# Patient Record
Sex: FEMALE | Employment: PART TIME | URBAN - METROPOLITAN AREA
[De-identification: names, ages, dates, MRNs, and addresses within clinical notes are randomized per-mention and may not be internally consistent; named-entity substitution may affect disease eponyms.]

---

## 2020-02-26 ENCOUNTER — TRANSCRIBE ORDERS (OUTPATIENT)
Dept: PERINATAL CARE | Facility: CLINIC | Age: 25
End: 2020-02-26

## 2020-02-26 DIAGNOSIS — O09.899 SUPERVISION OF OTHER HIGH RISK PREGNANCIES, UNSPECIFIED TRIMESTER: Primary | ICD-10-CM

## 2024-07-17 ENCOUNTER — TRANSCRIBE ORDERS (OUTPATIENT)
Facility: HOSPITAL | Age: 29
End: 2024-07-17

## 2024-07-17 DIAGNOSIS — O09.899 SUPERVISION OF OTHER HIGH RISK PREGNANCY, ANTEPARTUM: Primary | ICD-10-CM

## 2024-07-30 ENCOUNTER — ROUTINE PRENATAL (OUTPATIENT)
Dept: PERINATAL CARE | Facility: OTHER | Age: 29
End: 2024-07-30
Payer: COMMERCIAL

## 2024-07-30 ENCOUNTER — TELEPHONE (OUTPATIENT)
Dept: PERINATAL CARE | Facility: OTHER | Age: 29
End: 2024-07-30

## 2024-07-30 VITALS
SYSTOLIC BLOOD PRESSURE: 98 MMHG | BODY MASS INDEX: 30.39 KG/M2 | HEIGHT: 64 IN | HEART RATE: 99 BPM | DIASTOLIC BLOOD PRESSURE: 50 MMHG | WEIGHT: 178 LBS

## 2024-07-30 DIAGNOSIS — O99.282 THYROID DYSFUNCTION IN PREGNANCY IN SECOND TRIMESTER: ICD-10-CM

## 2024-07-30 DIAGNOSIS — Z3A.21 21 WEEKS GESTATION OF PREGNANCY: ICD-10-CM

## 2024-07-30 DIAGNOSIS — Z36.86 ENCOUNTER FOR ANTENATAL SCREENING FOR CERVICAL LENGTH: ICD-10-CM

## 2024-07-30 DIAGNOSIS — E07.9 THYROID DYSFUNCTION IN PREGNANCY IN SECOND TRIMESTER: ICD-10-CM

## 2024-07-30 DIAGNOSIS — E03.8 SUBCLINICAL HYPOTHYROIDISM: ICD-10-CM

## 2024-07-30 DIAGNOSIS — O09.292 PRIOR FETAL MACROSOMIA IN SECOND TRIMESTER, ANTEPARTUM: Primary | ICD-10-CM

## 2024-07-30 DIAGNOSIS — O09.899 SUPERVISION OF OTHER HIGH RISK PREGNANCY, ANTEPARTUM: ICD-10-CM

## 2024-07-30 PROCEDURE — 99243 OFF/OP CNSLTJ NEW/EST LOW 30: CPT | Performed by: OBSTETRICS & GYNECOLOGY

## 2024-07-30 PROCEDURE — 76811 OB US DETAILED SNGL FETUS: CPT | Performed by: OBSTETRICS & GYNECOLOGY

## 2024-07-30 PROCEDURE — 76817 TRANSVAGINAL US OBSTETRIC: CPT | Performed by: OBSTETRICS & GYNECOLOGY

## 2024-07-30 NOTE — LETTER
2024     Kenedy Gutierrez  [address]  [place] [address]    Patient: Jaida Leroy   YOB: 1995   Date of Visit: 2024       Dear Dr. Whitley:    Thank you for referring Jaida Leroy to me for evaluation. Below are my notes for this consultation.    If you have questions, please do not hesitate to call me. I look forward to following your patient along with you.         Sincerely,        Vonnie Akins MD        CC: No Recipients    Vonnie Akins MD  2024  6:24 AM  Sign when Signing Visit  OFFICE CONSULT  Raegan Cuadra Md  [address]  [place] [address]     Dear Dr. Cuadra     Thank you for requesting a  consultation on your patient Jaida Leroy for the following indications: Fetal anatomical survey.  She had a normal NIPT completed in this pregnancy.    History  Past medical history: subclinical hypothyroidism diagnosed in pregnancy but she reports having a history of being diagnosed with a thyroid nodule for which she was encouraged to have follow-up in the past. She was referred to endocrinology during this pregnancy by her ob office but still needs to  to schedule her visit.  Her most recent TSH was 3.38.  Thyroperoxidase level was normal showing no antibodies and her free T4 was normal at 1.06 indicating that she has subclinical hypothyroidism.  She was started on levothyroxine 25 mcg daily by her ob office. She also has history of genital herpes and is on Valtrex as needed.  Lastly she is B negative blood type.  Past surgical history: Bunion removal, wisdom teeth removed, history of ptosis repair  Medications: Planning on start pnv and a separate iron tab as she has mild anemia and is on Levothyroxine 25 mcg daily  Allergies to medications: none  Past obstetrical history:   1/15/2013 at 41 weeks she had an 8 pound 11 ounce baby boy vaginally without complications  3/18/2017 at 39 weeks she had an 8 pound baby girl vaginally  without complications  9/23/2020 at 39 weeks she had a 9 pound baby girl vaginally without complications  Social history: noncontributory  First generation family history: Her father has diabetes    Ultrasound findings: Ultrasound shows a fetus that is growing concordant with her dates.  Normal amniotic fluid and cervical length are seen.  No malformations are detected however views of the spine are limited secondary to fetal position.  Intracranial anatomy appears normal lowering the risk for missed spinal defect.    The patient was informed of the findings and counseled about the limitations of the exam in detecting all forms of fetal congenital abnormalities.    She does not report any vaginal bleeding or uterine cramping/contractions. She does feel fetal movement.      Specific counseling was provided on the following problems:  1.  In pregnancy subclinical hypothyroidism with a normal free T4 and no evidence for antithyroid peroxidase antibodies does not require treatment.  But if treatment is started then recommend obtaining normal pregnancy levels of TSH.  In the first trimester, the normal range for TSH level is 0.1 to 2.5 mIU/L; this level increases to 0.2 to 3.0 mIU/L in the second trimester and 0.3 to 3.0 mIU/L in the third trimester.  Her TSH levels should be rechecked with each trimester and then her meds titrated to keep her TSH in the normal range for pregnancy. Concurrent use of iron or calcium and her thyroid meds may result in decreased effectiveness of her meds.  Postpartum she can stop her thyroid medication but would recheck a level at 6 weeks postpartum to confirm she not develop overt hypothyroidism.    Follow up recommended:   Recommend an early diabetes screen due to family hx of diabetes and her history of having a macrosomic baby.  2.   Maternal anemia -she reports she was told to start iron.  I reviewed that iron absorption is inhibited by medication for reflux, coffee, tea, milk products  and calcium.  It is better absorbed on an empty stomach or with vitamin C and can be taken in every other day to limit constipation.   3.   Encouraged to restart her prenatal vitamins which can be taken with a meal.  4.   Thyroid medication needs to be taken on an empty stomach at least 1 hour prior to a meal and 4 hours before after iron supplementation.  She reports she will take her thyroid medication first thing in the morning.  She reports she will take her iron before bedtime on an empty stomach or when she wakes up in the middle the night to go the bathroom so that is at least 4 hours prior to taking her thyroid medication in the morning.  She will take her prenatal vitamin with a meal.   5.        Pre visit time reviewing her records  5 minutes  Face to face time 15 minutes  Post visit time on documentation of note, updating her problem list, adding orders and prescriptions 15 minutes.  Procedures that were completed today were charged separately.   The level of decision making was low level complexity.    Vonnie Akins MD

## 2024-07-30 NOTE — PROGRESS NOTES
OFFICE CONSULT  Raegan Cuadra Md  [address]  [place] [address]     Dear Dr. Cuadra     Thank you for requesting a  consultation on your patient Jaida Leroy for the following indications: Fetal anatomical survey.  She had a normal NIPT completed in this pregnancy.    History  Past medical history: subclinical hypothyroidism diagnosed in pregnancy but she reports having a history of being diagnosed with a thyroid nodule for which she was encouraged to have follow-up in the past. She was referred to endocrinology during this pregnancy by her ob office but still needs to  to schedule her visit.  Her most recent TSH was 3.38.  Thyroperoxidase level was normal showing no antibodies and her free T4 was normal at 1.06 indicating that she has subclinical hypothyroidism.  She was started on levothyroxine 25 mcg daily by her ob office. She also has history of genital herpes and is on Valtrex as needed.  Lastly she is B negative blood type.  Past surgical history: Bunion removal, wisdom teeth removed, history of ptosis repair  Medications: Planning on start pnv and a separate iron tab as she has mild anemia and is on Levothyroxine 25 mcg daily  Allergies to medications: none  Past obstetrical history:   1/15/2013 at 41 weeks she had an 8 pound 11 ounce baby boy vaginally without complications  3/18/2017 at 39 weeks she had an 8 pound baby girl vaginally without complications  2020 at 39 weeks she had a 9 pound baby girl vaginally without complications  Social history: noncontributory  First generation family history: Her father has diabetes    Ultrasound findings: Ultrasound shows a fetus that is growing concordant with her dates.  Normal amniotic fluid and cervical length are seen.  No malformations are detected however views of the spine are limited secondary to fetal position.  Intracranial anatomy appears normal lowering the risk for missed spinal defect.    The patient was informed of  the findings and counseled about the limitations of the exam in detecting all forms of fetal congenital abnormalities.    She does not report any vaginal bleeding or uterine cramping/contractions. She does feel fetal movement.      Specific counseling was provided on the following problems:  1.  In pregnancy subclinical hypothyroidism with a normal free T4 and no evidence for antithyroid peroxidase antibodies does not require treatment.  But if treatment is started then recommend obtaining normal pregnancy levels of TSH.  In the first trimester, the normal range for TSH level is 0.1 to 2.5 mIU/L; this level increases to 0.2 to 3.0 mIU/L in the second trimester and 0.3 to 3.0 mIU/L in the third trimester.  Her TSH levels should be rechecked with each trimester and then her meds titrated to keep her TSH in the normal range for pregnancy. Concurrent use of iron or calcium and her thyroid meds may result in decreased effectiveness of her meds.  Postpartum she can stop her thyroid medication but would recheck a level at 6 weeks postpartum to confirm she not develop overt hypothyroidism.    Follow up recommended:   Recommend an early diabetes screen due to family hx of diabetes and her history of having a macrosomic baby.  2.   Maternal anemia -she reports she was told to start iron.  I reviewed that iron absorption is inhibited by medication for reflux, coffee, tea, milk products and calcium.  It is better absorbed on an empty stomach or with vitamin C and can be taken in every other day to limit constipation.   3.   Encouraged to restart her prenatal vitamins which can be taken with a meal.  4.   Thyroid medication needs to be taken on an empty stomach at least 1 hour prior to a meal and 4 hours before after iron supplementation.  She reports she will take her thyroid medication first thing in the morning.  She reports she will take her iron before bedtime on an empty stomach or when she wakes up in the middle the night  to go the bathroom so that is at least 4 hours prior to taking her thyroid medication in the morning.  She will take her prenatal vitamin with a meal.   5.        Pre visit time reviewing her records  5 minutes  Face to face time 15 minutes  Post visit time on documentation of note, updating her problem list, adding orders and prescriptions 15 minutes.  Procedures that were completed today were charged separately.   The level of decision making was low level complexity.    Vonnie Akins MD

## 2024-07-30 NOTE — TELEPHONE ENCOUNTER
Patient was seen in [facility] office on 7/30. Per Dr. Akins's recommendations, patient should return in 10 weeks for a growth US. Spoke with patient and scheduled her for 10/7 at 11:30AM in the [facility] office. Requested patient call the office back at [phone] if she has any questions or would need to reschedule.

## 2024-08-01 PROBLEM — O99.282 THYROID DYSFUNCTION IN PREGNANCY IN SECOND TRIMESTER: Status: ACTIVE | Noted: 2024-08-01

## 2024-08-01 PROBLEM — E03.8 SUBCLINICAL HYPOTHYROIDISM: Status: ACTIVE | Noted: 2024-08-01

## 2024-08-01 PROBLEM — O09.292 PRIOR FETAL MACROSOMIA IN SECOND TRIMESTER, ANTEPARTUM: Status: ACTIVE | Noted: 2024-08-01

## 2024-08-01 PROBLEM — E07.9 THYROID DYSFUNCTION IN PREGNANCY IN SECOND TRIMESTER: Status: ACTIVE | Noted: 2024-08-01

## 2024-08-01 RX ORDER — LEVOTHYROXINE SODIUM 0.03 MG/1
25 TABLET ORAL DAILY
COMMUNITY
Start: 2024-07-23

## 2024-10-07 ENCOUNTER — ULTRASOUND (OUTPATIENT)
Dept: PERINATAL CARE | Facility: OTHER | Age: 29
End: 2024-10-07
Payer: COMMERCIAL

## 2024-10-07 VITALS
WEIGHT: 196.2 LBS | HEIGHT: 64 IN | DIASTOLIC BLOOD PRESSURE: 58 MMHG | SYSTOLIC BLOOD PRESSURE: 112 MMHG | HEART RATE: 93 BPM | BODY MASS INDEX: 33.49 KG/M2

## 2024-10-07 DIAGNOSIS — Z3A.31 31 WEEKS GESTATION OF PREGNANCY: ICD-10-CM

## 2024-10-07 DIAGNOSIS — E07.9 THYROID DYSFUNCTION IN PREGNANCY IN THIRD TRIMESTER: Primary | ICD-10-CM

## 2024-10-07 DIAGNOSIS — Z36.2 ENCOUNTER FOR FOLLOW-UP ULTRASOUND OF FETAL ANATOMY: ICD-10-CM

## 2024-10-07 DIAGNOSIS — Z36.89 ENCOUNTER FOR ULTRASOUND TO ASSESS FETAL GROWTH: ICD-10-CM

## 2024-10-07 DIAGNOSIS — O99.283 THYROID DYSFUNCTION IN PREGNANCY IN THIRD TRIMESTER: Primary | ICD-10-CM

## 2024-10-07 PROCEDURE — 76816 OB US FOLLOW-UP PER FETUS: CPT | Performed by: OBSTETRICS & GYNECOLOGY

## 2024-10-07 PROCEDURE — 99212 OFFICE O/P EST SF 10 MIN: CPT | Performed by: OBSTETRICS & GYNECOLOGY

## 2024-10-07 NOTE — PROGRESS NOTES
"[facility]: Jaida Leroy was seen today for fetal growth and followup missed anatomy ultrasound.  See ultrasound report under \"OB Procedures\" tab.   Please don't hesitate to contact our office with any concerns or questions.  -Magnolia Santacruz MD    "